# Patient Record
Sex: FEMALE | Race: WHITE | Employment: UNEMPLOYED | URBAN - METROPOLITAN AREA
[De-identification: names, ages, dates, MRNs, and addresses within clinical notes are randomized per-mention and may not be internally consistent; named-entity substitution may affect disease eponyms.]

---

## 2021-10-20 PROBLEM — Z12.11 SCREEN FOR COLON CANCER: Status: ACTIVE | Noted: 2021-10-20

## 2021-10-20 PROBLEM — Z12.31 SCREENING MAMMOGRAM, ENCOUNTER FOR: Status: ACTIVE | Noted: 2021-10-20

## 2021-10-20 PROBLEM — Z00.00 ROUTINE GENERAL MEDICAL EXAMINATION AT A HEALTH CARE FACILITY: Status: ACTIVE | Noted: 2021-10-20

## 2021-10-20 PROBLEM — Z12.4 SCREENING FOR CERVICAL CANCER: Status: ACTIVE | Noted: 2021-10-20

## 2021-10-27 ENCOUNTER — HOSPITAL ENCOUNTER (OUTPATIENT)
Dept: MAMMOGRAPHY | Age: 51
Discharge: HOME | End: 2021-10-27
Attending: FAMILY MEDICINE
Payer: COMMERCIAL

## 2021-10-27 DIAGNOSIS — Z12.31 SCREENING MAMMOGRAM, ENCOUNTER FOR: ICD-10-CM

## 2021-10-27 PROCEDURE — 77063 BREAST TOMOSYNTHESIS BI: CPT

## 2021-11-11 PROBLEM — Z01.419 WOMEN'S ANNUAL ROUTINE GYNECOLOGICAL EXAMINATION: Status: ACTIVE | Noted: 2021-11-11

## 2021-11-19 PROBLEM — Z00.00 ROUTINE GENERAL MEDICAL EXAMINATION AT A HEALTH CARE FACILITY: Status: RESOLVED | Noted: 2021-10-20 | Resolved: 2021-11-19

## 2021-11-19 PROBLEM — Z12.11 SCREEN FOR COLON CANCER: Status: RESOLVED | Noted: 2021-10-20 | Resolved: 2021-11-19

## 2022-03-18 PROBLEM — Z12.31 SCREENING MAMMOGRAM, ENCOUNTER FOR: Status: ACTIVE | Noted: 2021-10-20

## 2022-03-19 PROBLEM — Z01.419 WOMEN'S ANNUAL ROUTINE GYNECOLOGICAL EXAMINATION: Status: ACTIVE | Noted: 2021-11-11

## 2024-01-02 ENCOUNTER — OFFICE VISIT (OUTPATIENT)
Dept: FAMILY MEDICINE CLINIC | Facility: CLINIC | Age: 54
End: 2024-01-02
Payer: COMMERCIAL

## 2024-01-02 VITALS
WEIGHT: 207 LBS | DIASTOLIC BLOOD PRESSURE: 87 MMHG | SYSTOLIC BLOOD PRESSURE: 145 MMHG | BODY MASS INDEX: 31.37 KG/M2 | HEART RATE: 74 BPM | HEIGHT: 68 IN

## 2024-01-02 DIAGNOSIS — Z00.00 ROUTINE GENERAL MEDICAL EXAMINATION AT A HEALTH CARE FACILITY: ICD-10-CM

## 2024-01-02 DIAGNOSIS — D48.9 NEOPLASM OF UNCERTAIN BEHAVIOR: ICD-10-CM

## 2024-01-02 DIAGNOSIS — Z00.00 ROUTINE GENERAL MEDICAL EXAMINATION AT A HEALTH CARE FACILITY: Primary | ICD-10-CM

## 2024-01-02 DIAGNOSIS — Z12.31 SCREENING MAMMOGRAM FOR BREAST CANCER: ICD-10-CM

## 2024-01-02 DIAGNOSIS — Z12.11 SCREEN FOR COLON CANCER: ICD-10-CM

## 2024-01-02 LAB
ALBUMIN SERPL-MCNC: 4.2 G/DL (ref 3.5–5)
ALBUMIN/GLOB SERPL: 1.4 (ref 0.4–1.6)
ALP SERPL-CCNC: 64 U/L (ref 50–136)
ALT SERPL-CCNC: 23 U/L (ref 12–65)
ANION GAP SERPL CALC-SCNC: 3 MMOL/L (ref 2–11)
AST SERPL-CCNC: 12 U/L (ref 15–37)
BASOPHILS # BLD: 0.1 K/UL (ref 0–0.2)
BASOPHILS NFR BLD: 1 % (ref 0–2)
BILIRUB SERPL-MCNC: 0.2 MG/DL (ref 0.2–1.1)
BILIRUBIN, URINE, POC: NEGATIVE
BLOOD URINE, POC: NEGATIVE
BUN SERPL-MCNC: 17 MG/DL (ref 6–23)
CALCIUM SERPL-MCNC: 9.2 MG/DL (ref 8.3–10.4)
CHLORIDE SERPL-SCNC: 106 MMOL/L (ref 103–113)
CHOLEST SERPL-MCNC: 221 MG/DL
CO2 SERPL-SCNC: 28 MMOL/L (ref 21–32)
CREAT SERPL-MCNC: 0.9 MG/DL (ref 0.6–1)
DIFFERENTIAL METHOD BLD: NORMAL
EOSINOPHIL # BLD: 0.3 K/UL (ref 0–0.8)
EOSINOPHIL NFR BLD: 4 % (ref 0.5–7.8)
ERYTHROCYTE [DISTWIDTH] IN BLOOD BY AUTOMATED COUNT: 12.6 % (ref 11.9–14.6)
GLOBULIN SER CALC-MCNC: 3 G/DL (ref 2.8–4.5)
GLUCOSE SERPL-MCNC: 97 MG/DL (ref 65–100)
GLUCOSE URINE, POC: NEGATIVE
HCT VFR BLD AUTO: 44.5 % (ref 35.8–46.3)
HDLC SERPL-MCNC: 48 MG/DL (ref 40–60)
HDLC SERPL: 4.6
HGB BLD-MCNC: 14.5 G/DL (ref 11.7–15.4)
IMM GRANULOCYTES # BLD AUTO: 0.1 K/UL (ref 0–0.5)
IMM GRANULOCYTES NFR BLD AUTO: 1 % (ref 0–5)
KETONES, URINE, POC: NEGATIVE
LDLC SERPL CALC-MCNC: 120.4 MG/DL
LEUKOCYTE ESTERASE, URINE, POC: NEGATIVE
LYMPHOCYTES # BLD: 3 K/UL (ref 0.5–4.6)
LYMPHOCYTES NFR BLD: 37 % (ref 13–44)
MCH RBC QN AUTO: 29.9 PG (ref 26.1–32.9)
MCHC RBC AUTO-ENTMCNC: 32.6 G/DL (ref 31.4–35)
MCV RBC AUTO: 91.8 FL (ref 82–102)
MONOCYTES # BLD: 0.6 K/UL (ref 0.1–1.3)
MONOCYTES NFR BLD: 8 % (ref 4–12)
NEUTS SEG # BLD: 4.1 K/UL (ref 1.7–8.2)
NEUTS SEG NFR BLD: 49 % (ref 43–78)
NITRITE, URINE, POC: NEGATIVE
NRBC # BLD: 0 K/UL (ref 0–0.2)
PH, URINE, POC: 6.5 (ref 4.6–8)
PLATELET # BLD AUTO: 264 K/UL (ref 150–450)
PMV BLD AUTO: 9.7 FL (ref 9.4–12.3)
POTASSIUM SERPL-SCNC: 3.7 MMOL/L (ref 3.5–5.1)
PROT SERPL-MCNC: 7.2 G/DL (ref 6.3–8.2)
PROTEIN,URINE, POC: NEGATIVE
RBC # BLD AUTO: 4.85 M/UL (ref 4.05–5.2)
SODIUM SERPL-SCNC: 137 MMOL/L (ref 136–146)
SPECIFIC GRAVITY, URINE, POC: 1.02 (ref 1–1.03)
TRIGL SERPL-MCNC: 263 MG/DL (ref 35–150)
TSH, 3RD GENERATION: 1.07 UIU/ML (ref 0.36–3.74)
URINALYSIS CLARITY, POC: CLEAR
URINALYSIS COLOR, POC: YELLOW
UROBILINOGEN, POC: NORMAL
VLDLC SERPL CALC-MCNC: 52.6 MG/DL (ref 6–23)
WBC # BLD AUTO: 8.1 K/UL (ref 4.3–11.1)

## 2024-01-02 PROCEDURE — 99396 PREV VISIT EST AGE 40-64: CPT | Performed by: FAMILY MEDICINE

## 2024-01-02 PROCEDURE — 81003 URINALYSIS AUTO W/O SCOPE: CPT | Performed by: FAMILY MEDICINE

## 2024-01-02 RX ORDER — ESTRADIOL 0.03 MG/D
1 FILM, EXTENDED RELEASE TRANSDERMAL WEEKLY
Qty: 12 PATCH | Refills: 0 | Status: CANCELLED | OUTPATIENT
Start: 2024-01-02

## 2024-01-02 RX ORDER — ESTRADIOL 0.03 MG/D
1 FILM, EXTENDED RELEASE TRANSDERMAL
COMMUNITY

## 2024-01-02 RX ORDER — PROGESTERONE 100 MG/1
100 CAPSULE ORAL DAILY
COMMUNITY

## 2024-01-02 RX ORDER — MEDROXYPROGESTERONE ACETATE 10 MG/1
10 TABLET ORAL DAILY
Status: CANCELLED | OUTPATIENT
Start: 2024-01-02

## 2024-01-02 SDOH — ECONOMIC STABILITY: INCOME INSECURITY: HOW HARD IS IT FOR YOU TO PAY FOR THE VERY BASICS LIKE FOOD, HOUSING, MEDICAL CARE, AND HEATING?: PATIENT DECLINED

## 2024-01-02 SDOH — ECONOMIC STABILITY: FOOD INSECURITY: WITHIN THE PAST 12 MONTHS, THE FOOD YOU BOUGHT JUST DIDN'T LAST AND YOU DIDN'T HAVE MONEY TO GET MORE.: PATIENT DECLINED

## 2024-01-02 SDOH — ECONOMIC STABILITY: HOUSING INSECURITY
IN THE LAST 12 MONTHS, WAS THERE A TIME WHEN YOU DID NOT HAVE A STEADY PLACE TO SLEEP OR SLEPT IN A SHELTER (INCLUDING NOW)?: PATIENT DECLINED

## 2024-01-02 SDOH — ECONOMIC STABILITY: FOOD INSECURITY: WITHIN THE PAST 12 MONTHS, YOU WORRIED THAT YOUR FOOD WOULD RUN OUT BEFORE YOU GOT MONEY TO BUY MORE.: PATIENT DECLINED

## 2024-01-02 ASSESSMENT — ENCOUNTER SYMPTOMS
DIARRHEA: 0
COLOR CHANGE: 0
ABDOMINAL DISTENTION: 0
SHORTNESS OF BREATH: 0
BLOOD IN STOOL: 0
CHEST TIGHTNESS: 0
ABDOMINAL PAIN: 0
CONSTIPATION: 0

## 2024-01-02 ASSESSMENT — PATIENT HEALTH QUESTIONNAIRE - PHQ9
2. FEELING DOWN, DEPRESSED OR HOPELESS: 0
SUM OF ALL RESPONSES TO PHQ9 QUESTIONS 1 & 2: 0
1. LITTLE INTEREST OR PLEASURE IN DOING THINGS: 0
SUM OF ALL RESPONSES TO PHQ QUESTIONS 1-9: 0

## 2024-01-02 NOTE — PROGRESS NOTES
[facility]  _______________________________________  MD Vera Gallardo, REINALDO Dumont, MD Dayan Ya MD    [address] [place] [address]  Phone: [phone]  Fax: [phone]    Awilda Diehl (:  1970) is a 53 y.o. female,Established patient, here for evaluation of the following chief complaint(s):  Annual Exam         ASSESSMENT/PLAN:    1. Routine general medical examination at a health care facility  Doing well, needs to add aerobics.   Discussed need to increase aerobic exercise, reduce portion sizes. Recommended at least 30-40 min of aerobic exercise 4 days a week. Will continue to motivate.     - CBC with Auto Differential; Future  - Comprehensive Metabolic Panel; Future  - TSH; Future  - Lipid Panel; Future  - AMB POC URINALYSIS DIP STICK AUTO W/O MICRO    2. Screening mammogram for breast cancer  I let her know no one is going to write her for E without a timely mammo. She is going to get this done and also find a provider for GYN that she likes.   - [facility] DIGITAL SCREEN W OR WO CAD BILATERAL; Future    3. Screen for colon cancer  Willing to do this. Appreciate GI help.   - [facility]    4. Neoplasm of uncertain behavior  This looks like a dermal nevus but it is changing and she's had BCC on her face before AND she just spend 2+ years in [place]. Asking derm to give her the once over, appreciate their help.   - External Referral to Dermatology    FU 12m if labs OK      Subjective   SUBJECTIVE/OBJECTIVE:    Here for a checkup. Have not seen vega in over 2 yearsShe is asking for refills on her provera ans estrogen which GYN wrote for her but has not had a pelvic exam or a mammo in over 2 years.     HTN: No, but BP is slightly elevated today. 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   BP Readings from Last 3 Encounters:   24 (!) 145/87   21 128/76   10/20/21

## 2024-01-09 ENCOUNTER — HOSPITAL ENCOUNTER (OUTPATIENT)
Dept: MAMMOGRAPHY | Age: 54
Discharge: HOME | End: 2024-01-12
Attending: FAMILY MEDICINE
Payer: COMMERCIAL

## 2024-01-09 VITALS — HEIGHT: 69 IN | WEIGHT: 207 LBS | BODY MASS INDEX: 30.66 KG/M2

## 2024-01-09 DIAGNOSIS — Z12.31 SCREENING MAMMOGRAM FOR BREAST CANCER: ICD-10-CM

## 2024-01-09 PROCEDURE — 77063 BREAST TOMOSYNTHESIS BI: CPT

## 2024-01-10 NOTE — PROGRESS NOTES
Live Births   0 0 0 0 0 2      # Outcome Date GA Lbr Britton/2nd Weight Sex Delivery Anes PTL Lv   4 AB            3 AB            2 Term            1 Term               Obstetric Comments   2         ROS:    Review of Systems  General: Not Present- Chills, Fever, Fatigue, Insomnia, Hot flashes/Night sweats, Weight gain  Skin: Not Present- Bruising, Change in Wart/Mole, Excessive Sweating, Itching, Nail Changes, New Lesions, Rash, Skin Color Changes and Ulcer.  HEENT: Not Present- Headache, Blurred Vision, Double Vision, Glaucoma, Visual Disturbances, Hearing Loss, Ringing in the Ears, Vertigo, Nose Bleed, Bleeding Gums, Hoarseness and Sore Throat.  Neck: Not Present- Neck Pain and Neck Swelling.  Respiratory: Not Present- Cough, Difficulty Breathing and Difficulty Breathing on Exertion.  Breast: Not Present- Breast Mass, Breast Pain, Breast Swelling, Nipple Discharge, Nipple Pain, Recent Breast Size Changes and Skin Changes.  Cardiovascular: Not Present- Abnormal Blood Pressure, Chest Pain, Edema, Fainting / Blacking Out, Palpitations, Shortness of Breath and Swelling of Extremities.  Gastrointestinal: Not Present- Abdominal Pain, Abdominal Swelling, Bloating, Change in Bowel Habits, Constipation, Diarrhea, Difficulty Swallowing, Gets full quickly at meals, Nausea, Rectal Bleeding and Vomiting.  Female Genitourinary: Not Present- Dysmenorrhea, Dyspareunia, Decreased libido, Excessive Menstrual Bleeding, Menstrual Irregularities, Pelvic Pain, Urinary Complaints, Vaginal Discharge, Vaginal itching/burning, Vaginal odor  Musculoskeletal: Not Present- Joint Pain and Muscle Pain.  Neurological: Not Present- Dizziness, Fainting, Headaches and Seizures.  Psychiatric: Not Present- Anxiety, Depression, Mood changes and Panic Attacks.  Endocrine: Not Present- Appetite Changes, Cold Intolerance, Excessive Thirst, Excessive Urination and Heat Intolerance.  Hematology: Not Present- Abnormal Bleeding, Easy Bruising and Enlarged

## 2024-01-11 ENCOUNTER — OFFICE VISIT (OUTPATIENT)
Dept: OBGYN CLINIC | Age: 54
End: 2024-01-11
Payer: COMMERCIAL

## 2024-01-11 VITALS — DIASTOLIC BLOOD PRESSURE: 82 MMHG | SYSTOLIC BLOOD PRESSURE: 130 MMHG | WEIGHT: 205 LBS | BODY MASS INDEX: 30.27 KG/M2

## 2024-01-11 DIAGNOSIS — N95.1 MENOPAUSE SYNDROME: Primary | ICD-10-CM

## 2024-01-11 DIAGNOSIS — R68.82 DECREASED LIBIDO: ICD-10-CM

## 2024-01-11 PROCEDURE — 99203 OFFICE O/P NEW LOW 30 MIN: CPT | Performed by: OBSTETRICS & GYNECOLOGY

## 2024-01-11 RX ORDER — ESTRADIOL 1 MG/1
1 TABLET ORAL DAILY
Qty: 90 TABLET | Refills: 4 | Status: SHIPPED | OUTPATIENT
Start: 2024-01-11

## 2024-01-11 RX ORDER — MEDROXYPROGESTERONE ACETATE 2.5 MG/1
2.5 TABLET ORAL DAILY
Qty: 90 TABLET | Refills: 4 | Status: SHIPPED | OUTPATIENT
Start: 2024-01-11

## 2024-01-11 RX ORDER — TESTOSTERONE MICRONIZED 100 %
POWDER (GRAM) MISCELLANEOUS
Qty: 90 EACH | Refills: 5 | Status: SHIPPED | OUTPATIENT
Start: 2024-01-11 | End: 2025-01-11

## 2024-02-01 PROBLEM — Z12.11 SCREEN FOR COLON CANCER: Status: RESOLVED | Noted: 2024-01-02 | Resolved: 2024-02-01

## 2024-02-01 PROBLEM — Z00.00 ROUTINE GENERAL MEDICAL EXAMINATION AT A HEALTH CARE FACILITY: Status: RESOLVED | Noted: 2021-11-11 | Resolved: 2024-02-01

## 2024-04-03 ENCOUNTER — TELEPHONE (OUTPATIENT)
Dept: GASTROENTEROLOGY | Age: 54
End: 2024-04-03

## 2024-06-25 DIAGNOSIS — R68.82 DECREASED LIBIDO: ICD-10-CM

## 2024-06-26 ENCOUNTER — PATIENT MESSAGE (OUTPATIENT)
Dept: OBGYN CLINIC | Age: 54
End: 2024-06-26

## 2024-06-26 DIAGNOSIS — R68.82 DECREASED LIBIDO: ICD-10-CM

## 2024-06-26 RX ORDER — TESTOSTERONE MICRONIZED 100 %
POWDER (GRAM) MISCELLANEOUS
Qty: 90 EACH | Refills: 1 | Status: SHIPPED | OUTPATIENT
Start: 2024-06-26 | End: 2024-06-26 | Stop reason: SDUPTHER

## 2024-06-26 RX ORDER — TESTOSTERONE MICRONIZED 100 %
POWDER (GRAM) MISCELLANEOUS
Qty: 90 EACH | Refills: 1 | Status: SHIPPED | OUTPATIENT
Start: 2024-06-26 | End: 2025-06-26

## 2024-06-26 NOTE — TELEPHONE ENCOUNTER
From: Awilda Diehl  To: Dr. Homa Mejía  Sent: 6/26/2024 2:07 PM EDT  Subject: Refill    Hello, this medication is not available at [facility] pharmacy. [facility] is the pharmacy recommended by Dr Mejía. I had my previous prescription filled there [phone]. Thank you, Awilda Diehl

## 2025-01-13 ENCOUNTER — OFFICE VISIT (OUTPATIENT)
Dept: OBGYN CLINIC | Age: 55
End: 2025-01-13
Payer: COMMERCIAL

## 2025-01-13 VITALS
HEIGHT: 69 IN | WEIGHT: 215 LBS | BODY MASS INDEX: 31.84 KG/M2 | SYSTOLIC BLOOD PRESSURE: 120 MMHG | DIASTOLIC BLOOD PRESSURE: 88 MMHG

## 2025-01-13 DIAGNOSIS — Z12.31 SCREENING MAMMOGRAM, ENCOUNTER FOR: ICD-10-CM

## 2025-01-13 DIAGNOSIS — R68.82 DECREASED LIBIDO: ICD-10-CM

## 2025-01-13 DIAGNOSIS — N95.1 MENOPAUSE SYNDROME: Primary | ICD-10-CM

## 2025-01-13 PROCEDURE — 99214 OFFICE O/P EST MOD 30 MIN: CPT | Performed by: OBSTETRICS & GYNECOLOGY

## 2025-01-13 RX ORDER — TESTOSTERONE
POWDER (GRAM) MISCELLANEOUS
Qty: 90 EACH | Refills: 1 | Status: SHIPPED | OUTPATIENT
Start: 2025-01-13 | End: 2026-01-13

## 2025-01-13 RX ORDER — TESTOSTERONE
POWDER (GRAM) MISCELLANEOUS
Qty: 90 EACH | Refills: 1 | Status: SHIPPED | OUTPATIENT
Start: 2025-01-13 | End: 2025-01-13 | Stop reason: DRUGHIGH

## 2025-01-13 RX ORDER — ESTRADIOL 1 MG/1
1 TABLET ORAL DAILY
Qty: 90 TABLET | Refills: 4 | Status: SHIPPED | OUTPATIENT
Start: 2025-01-13 | End: 2025-01-16 | Stop reason: SDUPTHER

## 2025-01-13 RX ORDER — MEDROXYPROGESTERONE ACETATE 2.5 MG/1
2.5 TABLET ORAL DAILY
Qty: 90 TABLET | Refills: 4 | Status: SHIPPED | OUTPATIENT
Start: 2025-01-13

## 2025-01-13 NOTE — PROGRESS NOTES
error  
call to schedule  Colonoscopy: GP discussed REF  Bone Density:    ROS:  Review of Systems  General: Not Present- Fatigue, Insomnia, Hot flashes/Night sweats, Weight gain, Brain fog  Breast: Not Present- Breast Mass, Breast Pain, Breast Swelling, Nipple Discharge, Nipple Pain, Recent Breast Size Changes and Skin Changes.  Gastrointestinal: Not Present- Abdominal Pain,  Bloating, Constipation, Diarrhea, Nausea, Rectal bleeding  Female Genitourinary: Not Present- Dysmenorrhea, Dyspareunia, Decreased libido, Excessive Menstrual Bleeding, Menstrual Irregularities, Pelvic Pain, Urinary Complaints, Vaginal Discharge, Vaginal itching/burning, Vaginal odor, Vaginal dryness  Psychiatric: Not Present- Anxiety, Depression, Mood changes and Panic Attacks.    PHYSICAL EXAM:    /88   Ht 1.753 m (5' 9\")   Wt 97.5 kg (215 lb)   LMP 12/25/2024   BMI 31.75 kg/m²           Medical problems and test results were reviewed with the patient today.     ASSESSMENT and PLAN    1. Menopause syndrome  -     medroxyPROGESTERone (PROVERA) 2.5 MG tablet; Take 1 tablet by mouth daily, Disp-90 tablet, R-4Normal  -     estradiol (ESTRACE) 1 MG tablet; Take 1 tablet by mouth daily, Disp-90 tablet, R-4Normal  2. Decreased libido  -     Testosterone POWD; Testosterone 2 mg Danielle; 1 dnaielle daily, Disp-90 each, R-1Normal  3. Screening mammogram, encounter for  -     [facility] DIGITAL SCREEN BILATERAL; Future     Will increase testosterone danielle to 2 mg.          Time:  I spent  30 minutes in preparing to see patient (including chart review and preparation), obtaining and/or reviewing additional medical history, performing a physical exam and evaluation, documenting clinical information in the electronic health record, independently interpreting results, communicating results to patient, family or caregiver, and/or coordinating care.          No follow-ups on file.       Chaperone utilized during exam    CHUY LI MD

## 2025-01-16 DIAGNOSIS — N95.1 MENOPAUSE SYNDROME: ICD-10-CM

## 2025-01-16 RX ORDER — ESTRADIOL 1 MG/1
1 TABLET ORAL DAILY
Qty: 90 TABLET | Refills: 4 | Status: SHIPPED | OUTPATIENT
Start: 2025-01-16

## 2025-01-20 ENCOUNTER — HOSPITAL ENCOUNTER (OUTPATIENT)
Dept: MAMMOGRAPHY | Age: 55
Discharge: HOME | End: 2025-01-23
Attending: OBSTETRICS & GYNECOLOGY
Payer: COMMERCIAL

## 2025-01-20 DIAGNOSIS — Z12.31 SCREENING MAMMOGRAM, ENCOUNTER FOR: ICD-10-CM

## 2025-01-20 PROCEDURE — 77063 BREAST TOMOSYNTHESIS BI: CPT
